# Patient Record
Sex: FEMALE | Race: WHITE | Employment: FULL TIME | URBAN - METROPOLITAN AREA
[De-identification: names, ages, dates, MRNs, and addresses within clinical notes are randomized per-mention and may not be internally consistent; named-entity substitution may affect disease eponyms.]

---

## 2021-01-13 ENCOUNTER — OFFICE VISIT (OUTPATIENT)
Dept: RHEUMATOLOGY | Age: 36
End: 2021-01-13
Payer: COMMERCIAL

## 2021-01-13 VITALS — TEMPERATURE: 97.7 F | WEIGHT: 177 LBS | BODY MASS INDEX: 30.22 KG/M2 | HEIGHT: 64 IN

## 2021-01-13 DIAGNOSIS — M25.50 POLYARTHRALGIA: Primary | ICD-10-CM

## 2021-01-13 DIAGNOSIS — L98.9 LESION OF SUBCUTANEOUS TISSUE: ICD-10-CM

## 2021-01-13 DIAGNOSIS — M25.50 POLYARTHRALGIA: ICD-10-CM

## 2021-01-13 PROCEDURE — 99244 OFF/OP CNSLTJ NEW/EST MOD 40: CPT | Performed by: INTERNAL MEDICINE

## 2021-01-13 NOTE — PROGRESS NOTES
[facility], [place]                                                           [address] [place] [address], Sarah [address]0 (Y) [phone] (F)      Dear Dr. Taryn Tyler PA:  Please find Rheumatology assessment. Thank you for giving me the opportunity to be involved in LUCY Mendez's care and I look forward following BODØ along with you. If you have any questions or concerns please feel free to reach me. Note is transcribed using voice recognition software. Inadvertent computerized transcription errors may be present. Patient identification: Ivan Smith: [id number] y.o. Sex: female     A/P  BODØ was seen today for establish care and joint pain. Diagnoses and all orders for this visit:    Polyarthralgia  -     HLA-B27 Antigen; Future  -     Rheumatoid Factor; Future  -     Hepatitis B Surface Antigen; Future  -     Hepatitis B Core Antibody, IgM; Future  -     Hepatitis C Antibody; Future    Lesion of subcutaneous tissue        Multiple erythematous, indurated, tender  lesions in both lower extremities, yolanda in her left leg like erythema nodosum lesions. Right leg lesions- erythematous blotchy macular areas without induration in sc tissue. Onset-late November 2020. Sporadic intercurrent arthralgias in different joints at different times, does not have any joint pain at this time however had bilateral ankle pain and swelling that lasted about 3 weeks which began at the onset of her lower extremity where she describes as bruises. Low back pain and bilateral knee pain-likely mechanical no findings at this time. Given above findings although she did not have respiratory symptoms, will keep acute sarcoid in d/d. Meantime, also checks labs as above for inflammatory arthritis given family history of rheumatoid arthritis. Clinically, she does not have any other findings other than leg lesions. Therapeutically-recommend taking ibuprofen 600 mg 3 times a day for next 7 to 10 days. If symptoms are not getting better or getting worse, may plan for short course of corticosteroid. She was advised to call me with any new symptoms or flares, I will be happy to reassess, otherwise reassessment in 3 to 4 months time. She was advised to keep a journal of her symptoms. Patient indicates understanding and agrees with the management plan. I reviewed patient's history, referral documents and electronic medical records including referral documents from primary care physician's office scanned in media section. #######################################################################    REASON FOR CONSULTATION:  Patient is being seen at the request of  Elodia Eugene / Elodia Gale for evaluation and management of joint pain and leg lesions.     HISTORY OF PRESENT ILLNESS: 38 y.o. female states that she has had intercurrent migratory arthralgias in different joints and muscles at different times ever since she remembers but was able to manage with ibuprofen here and there. She got worried when she started noticing what she describes as bruises-large painful skin blotches in both legs initially began in her right leg then to her left leg and at the meantime she noticed swelling of both ankles that lasted for 3 weeks. Knees, elbows, low back also bother here time to time yolanda with use and wt bearing. No MK symptoms today. No resp symptom. Denies weight loss, objective fever, skin thickening, photosensitivity Raynauds, focal alopecia, recurrent ocular congestion, dry eyes or mouth, or mucosal ulcers, tinnitus or recent hearing loss. Denies chest pain, palpitations, cough, pleurisy, dysphagia, or features of inflammatory bowel diseases. No h/o blood clots or bleeding disorders. No renal or genitourinary problems. No focal weakness or persistent paresthesia. All other ROS are negative. PMH, PSH,Social history , Meds reviewed. FH- Grandmother had RA    PHYSICAL EXAM:    Vitals:    Temp 97.7 °F (36.5 °C) (Skin)   Ht 5' 4\" (1.626 m)   Wt 177 lb (80.3 kg)   BMI 30.38 kg/m²   AA)x3 well nourished, and well groomed, normal judgement. MKS: normal [address] exam in upper, lower ext and spine. Skin: Multiple erythematous skin lesions of varying size and morphology anywhere between a few millimeters to 1 cm-with subcutaneous induration and tenderness-left leg lesions feel like erythema nodosum lesions. Right leg lesions-just erythematous lesions without subcu induration. Per patient, right leg lesions were also hard and painful when it began. No other lesions seen in any other part of the skin. HEENT: Normal lids, lacrimal glands and pupils. No oral or nasal ulcers. Salivary glands reveal no evidence of abnormality. External inspection of the ears and nose within normal limits. Chest: Normal effort, CTA, s1/2 normal, no edema  No neck adenopathy        DATA:   Lab Results   Component Value Date    WBC 6.2 02/04/2015    HGB 10.7 (L) 02/04/2015    HCT 34.6 (L) 02/04/2015    MCV 80.6 02/04/2015     02/04/2015         Chemistry    No results found for: NA, K, CL, CO2, BUN, CREATININE No results found for: CALCIUM, ALKPHOS, AST, ALT, BILITOT       No results found for: LABURIC  No results found for: CRP  No results found for: SARAH, RHEUMFACTOR, SEDRATE  No results found for: CKTOTAL  Lab Results   Component Value Date    TSH 6.70 (H) 11/16/2016           Radiology Review:     A/P- See above.

## 2021-01-14 DIAGNOSIS — L98.9 LESION OF SUBCUTANEOUS TISSUE: ICD-10-CM

## 2021-01-14 LAB
HEPATITIS B CORE IGM ANTIBODY: NORMAL
HEPATITIS B SURFACE ANTIGEN INTERPRETATION: NORMAL
HEPATITIS C ANTIBODY INTERPRETATION: NORMAL
HLA B27: NEGATIVE
RHEUMATOID FACTOR: <10 IU/ML

## 2021-01-16 LAB — ANGIOTENSIN CONVERTING ENZYME: 33 U/L (ref 9–67)

## 2023-04-03 ENCOUNTER — HOSPITAL ENCOUNTER (OUTPATIENT)
Dept: GENERAL RADIOLOGY | Age: 38
Discharge: HOME | End: 2023-04-03
Payer: COMMERCIAL

## 2023-04-03 DIAGNOSIS — M54.2 CERVICALGIA: ICD-10-CM

## 2023-04-03 PROCEDURE — 72040 X-RAY EXAM NECK SPINE 2-3 VW: CPT
